# Patient Record
Sex: MALE | Race: WHITE
[De-identification: names, ages, dates, MRNs, and addresses within clinical notes are randomized per-mention and may not be internally consistent; named-entity substitution may affect disease eponyms.]

---

## 2018-09-24 ENCOUNTER — HOSPITAL ENCOUNTER (OUTPATIENT)
Dept: HOSPITAL 72 - MRI | Age: 48
Discharge: HOME | End: 2018-09-24
Payer: COMMERCIAL

## 2018-09-24 DIAGNOSIS — R05: Primary | ICD-10-CM

## 2018-09-24 DIAGNOSIS — N28.1: ICD-10-CM

## 2018-09-24 PROCEDURE — 71555 MRI ANGIO CHEST W OR W/O DYE: CPT

## 2018-09-24 NOTE — DIAGNOSTIC IMAGING REPORT
Indication:  COUGH

 

Technique: MRI of the chest was performed without and with intravenous contrast a

multiplanar, multisequence acquisition. The following sequences were obtained:

Large field-of-view 3 plane localizer; axial noncontrast 2-D fiesta fat sat and axial

black blood images of the chest; sagittal coronal 2-D chest and coronal SS FSE; post

contrast axial and coronal images of the chest. 3-D reconstructions of the thoracic

vasculature also created.

 

Comparison: None.

 

Findings: These note that evaluation of the lung parenchyma and soft tissues of the

mediastinum is limited on MRI. CT would provide a more sensitive evaluation.

 

Large field-of-view 3 plane localizer images demonstrate a approximately 4.9 cm

simple appearing cyst in the midpole of the left kidney. Additionally there is

susceptibility artifact in the region of the lower lumbar spine suggesting spinal

hardware, likely prior posterior instrumented fusion. Correlation with surgical

history is recommended.

 

The thoracic aorta is normal in caliber. Conventional branching anatomy of the aortic

arch is noted. The imaged portions of the bilateral common carotid and subclavian

arteries are unremarkable in appearance. Imaged portions of the abdominal aorta are

normal in caliber. There is suggestion of a replaced hepatic artery to the superior

mesenteric artery, a normal anatomic variant. Bilateral renal arteries appear normal

in caliber.

 

No definite focal lung parenchymal abnormality is identified. There is no evidence of

pleural effusion. No appreciable hilar or mediastinal lymphadenopathy. Some prominent

anterior mediastinal fat is noted.

 

IMPRESSION: 

*  Thoracic aorta normal in caliber with conventional branching anatomy of the aortic

arch. No evidence of thoracic aortic aneurysm.

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

superior mesenteric artery.

*  No definite parenchymal abnormality noted in the lungs or definite mediastinal

abnormality. Please note that the lung parenchyma and mediastinum are better

evaluated on contrast-enhanced CT.

 

Additional findings as above.